# Patient Record
Sex: MALE | Race: WHITE | HISPANIC OR LATINO | URBAN - METROPOLITAN AREA
[De-identification: names, ages, dates, MRNs, and addresses within clinical notes are randomized per-mention and may not be internally consistent; named-entity substitution may affect disease eponyms.]

---

## 2022-04-08 ENCOUNTER — TELEPHONE (OUTPATIENT)
Dept: GASTROENTEROLOGY | Facility: CLINIC | Age: 42
End: 2022-04-08

## 2022-04-08 ENCOUNTER — HOSPITAL ENCOUNTER (OUTPATIENT)
Dept: RADIOLOGY | Facility: HOSPITAL | Age: 42
Discharge: HOME | End: 2022-04-08
Attending: INTERNAL MEDICINE

## 2022-04-08 ENCOUNTER — OFFICE VISIT (OUTPATIENT)
Dept: GASTROENTEROLOGY | Facility: CLINIC | Age: 42
End: 2022-04-08

## 2022-04-08 VITALS
WEIGHT: 182.75 LBS | HEART RATE: 70 BPM | DIASTOLIC BLOOD PRESSURE: 70 MMHG | HEIGHT: 68 IN | BODY MASS INDEX: 27.7 KG/M2 | SYSTOLIC BLOOD PRESSURE: 110 MMHG

## 2022-04-08 DIAGNOSIS — R10.13 EPIGASTRIC ABDOMINAL PAIN: ICD-10-CM

## 2022-04-08 DIAGNOSIS — K59.04 CHRONIC IDIOPATHIC CONSTIPATION: ICD-10-CM

## 2022-04-08 DIAGNOSIS — R10.13 EPIGASTRIC PAIN: Primary | ICD-10-CM

## 2022-04-08 PROCEDURE — 74018 RADEX ABDOMEN 1 VIEW: CPT | Mod: 26,,, | Performed by: RADIOLOGY

## 2022-04-08 PROCEDURE — 99204 OFFICE O/P NEW MOD 45 MIN: CPT | Mod: S$PBB,,, | Performed by: INTERNAL MEDICINE

## 2022-04-08 PROCEDURE — 74018 XR ABDOMEN AP 1 VIEW: ICD-10-PCS | Mod: 26,,, | Performed by: RADIOLOGY

## 2022-04-08 PROCEDURE — 74018 RADEX ABDOMEN 1 VIEW: CPT | Mod: TC

## 2022-04-08 PROCEDURE — 99204 PR OFFICE/OUTPT VISIT, NEW, LEVL IV, 45-59 MIN: ICD-10-PCS | Mod: S$PBB,,, | Performed by: INTERNAL MEDICINE

## 2022-04-08 PROCEDURE — 99999 PR PBB SHADOW E&M-NEW PATIENT-LVL IV: CPT | Mod: PBBFAC,,, | Performed by: INTERNAL MEDICINE

## 2022-04-08 PROCEDURE — 99999 PR PBB SHADOW E&M-NEW PATIENT-LVL IV: ICD-10-PCS | Mod: PBBFAC,,, | Performed by: INTERNAL MEDICINE

## 2022-04-08 PROCEDURE — 99204 OFFICE O/P NEW MOD 45 MIN: CPT | Mod: PBBFAC | Performed by: INTERNAL MEDICINE

## 2022-04-08 RX ORDER — OMEPRAZOLE 40 MG/1
40 CAPSULE, DELAYED RELEASE ORAL DAILY
Qty: 90 CAPSULE | Refills: 0 | Status: SHIPPED | OUTPATIENT
Start: 2022-04-08 | End: 2022-07-07

## 2022-04-08 NOTE — ASSESSMENT & PLAN NOTE
-Tried Miralax in the past without improvement   -Linzess 145mcg   -Continue high water intake  -High fiber diet  -Abdominal xray ordered

## 2022-04-08 NOTE — PROGRESS NOTES
"Clinic Consult:  [facility] Consultation Note    Reason for Consult:  The primary encounter diagnosis was Epigastric pain. Diagnoses of Chronic idiopathic constipation and Epigastric abdominal pain were also pertinent to this visit.    PCP: Primary Doctor No   No address on file    HPI:  This is a 41 y.o. male here for evaluation of abdominal pain.   He has been experiencing abdominal pain and burning in epigastric region.   This has been occurring for about 7 months.   Denies nausea, vomiting, hematemesis, diarrhea, melena or hematochezia.   He is having bowel movements daily but its small volume and sometimes he feels like he is unable to go.   Can occasionally have lower abdominal pain.   He had a colonoscopy in 2015.   Never had an upper endoscopy.   Patient is not on any medications.         ROS:  Review of Systems   Constitutional: Negative for activity change and unexpected weight change.   HENT: Negative for trouble swallowing.    Gastrointestinal: Positive for abdominal distention, abdominal pain and constipation. Negative for anal bleeding, blood in stool, diarrhea, nausea, rectal pain and vomiting.           Medical History:   History reviewed. No pertinent past medical history.    Surgical History:  History reviewed. No pertinent surgical history.    Family History:   History reviewed. No pertinent family history.    Social History:   Social History     Tobacco Use    Smoking status: Former Smoker     Types: Cigarettes    Smokeless tobacco: Never Used   Substance Use Topics    Alcohol use: Never    Drug use: Never       Allergies: Reviewed    Home Medications:         Physical Exam:  Vital Signs:  /70   Pulse 70   Ht 5' 8" (1.727 m)   Wt 82.9 kg (182 lb 12.2 oz)   BMI 27.79 kg/m²   Body mass index is 27.79 kg/m².    Physical Exam  Constitutional:       Appearance: Normal appearance.   Eyes:      Conjunctiva/sclera: Conjunctivae normal.   Abdominal:      General: Abdomen is " flat. There is no distension.      Palpations: Abdomen is soft.      Tenderness: There is no abdominal tenderness. There is no guarding.   Neurological:      Mental Status: He is alert.          Labs: Pertinent labs reviewed.        Assessment:  Problem List Items Addressed This Visit        GI    Epigastric abdominal pain    Current Assessment & Plan     -US abdomen   -PPI PO daily            Chronic idiopathic constipation    Current Assessment & Plan     -Tried Miralax in the past without improvement   -Linzess 145mcg   -Continue high water intake  -High fiber diet  -Abdominal xray ordered              Other Visit Diagnoses     Epigastric pain    -  Primary        Epigastric pain  -     US Abdomen Complete; Future; Expected date: 04/08/2022    Chronic idiopathic constipation  -     X-Ray Abdomen AP 1 View; Future; Expected date: 04/08/2022    Epigastric abdominal pain    Other orders  -     omeprazole (PRILOSEC) 40 MG capsule; Take 1 capsule (40 mg total) by mouth once daily.  Dispense: 90 capsule; Refill: 0  -     linaCLOtide (LINZESS) 145 mcg Cap capsule; Take 1 capsule (145 mcg total) by mouth before breakfast.  Dispense: 90 capsule; Refill: 0         Follow-up after diagnostic evaluation.       Order summary:  Orders Placed This Encounter   Procedures    US Abdomen Complete    X-Ray Abdomen AP 1 View       Thank you so much for allowing me to participate in the care of Luis Enamorado MD  Gastroenterology and Hepatology  [facility]

## 2022-04-12 ENCOUNTER — HOSPITAL ENCOUNTER (OUTPATIENT)
Dept: RADIOLOGY | Facility: HOSPITAL | Age: 42
Discharge: HOME | End: 2022-04-12
Attending: INTERNAL MEDICINE

## 2022-04-12 DIAGNOSIS — R10.13 EPIGASTRIC PAIN: ICD-10-CM

## 2022-04-12 PROCEDURE — 76700 US ABDOMEN COMPLETE: ICD-10-PCS | Mod: 26,,, | Performed by: RADIOLOGY

## 2022-04-12 PROCEDURE — 76700 US EXAM ABDOM COMPLETE: CPT | Mod: TC,PO

## 2022-04-12 PROCEDURE — 76700 US EXAM ABDOM COMPLETE: CPT | Mod: 26,,, | Performed by: RADIOLOGY

## 2022-07-19 ENCOUNTER — TELEPHONE (OUTPATIENT)
Dept: GASTROENTEROLOGY | Facility: CLINIC | Age: 42
End: 2022-07-19

## 2022-07-19 DIAGNOSIS — R10.9 ABDOMINAL PAIN, UNSPECIFIED ABDOMINAL LOCATION: Primary | ICD-10-CM

## 2022-07-19 NOTE — TELEPHONE ENCOUNTER
----- Message from Rhoda Love sent at 7/19/2022  4:33 PM CDT -----  Contact: [phone]  Patient wife  would like to consult with a nurse in regards to her  current pains. Please call back at [phone]. Thanks r/s

## 2022-07-19 NOTE — TELEPHONE ENCOUNTER
Returned call to pt's wife who stated pt is complaining of pressure and sharp pain in the lower abdomen. Pt states he is not constipated. Offered appt but he does not want to pay for a visit in case he has to have a scope. Pt's wife stated she thinks it was an EGD and not a Colonoscopy that he had in 2015. She will call and have the records sent to us. Pt's wife would like a call back in the mean time for advice on what he can do about the pain.

## 2022-07-25 ENCOUNTER — TELEPHONE (OUTPATIENT)
Dept: GASTROENTEROLOGY | Facility: CLINIC | Age: 42
End: 2022-07-25

## 2022-07-25 NOTE — TELEPHONE ENCOUNTER
----- Message from Roselyn Blair sent at 7/25/2022  8:11 AM CDT -----  Contact: Shana/ Wife  Shana is needing a call back regarding the patient's procedure he is set to have and she is stating no one has contacted her regarding a date or anything. Please call her back at [phone] after 1pm today.

## 2022-08-15 ENCOUNTER — TELEPHONE (OUTPATIENT)
Dept: GASTROENTEROLOGY | Facility: CLINIC | Age: 42
End: 2022-08-15

## 2022-08-15 NOTE — TELEPHONE ENCOUNTER
----- Message from Rhoda Love sent at 8/15/2022  9:56 AM CDT -----  Contact: [phone]  Patient wife  would like to consult with a nurse in regards to a referral for a urologist concerning his bladder. Please call back at [phone]. Thanks r/s

## 2022-08-15 NOTE — TELEPHONE ENCOUNTER
Returned call to pt's wife who stated pt believes his abdominal pain is coming from his bladder and would like to see a Urologist right away instead of doing the Endo procedure. Please advise if a referral can be placed for pt to see Urology.

## 2022-08-15 NOTE — TELEPHONE ENCOUNTER
Spoke with pt's wife who stated pt does not have a PCP who can refer him to Urology. Appt scheduled for 8/17/22. Pt does not have insurance and will pay cash.

## 2022-08-17 ENCOUNTER — TELEPHONE (OUTPATIENT)
Dept: UROLOGY | Facility: CLINIC | Age: 42
End: 2022-08-17

## 2022-08-17 ENCOUNTER — OFFICE VISIT (OUTPATIENT)
Dept: UROLOGY | Facility: CLINIC | Age: 42
End: 2022-08-17

## 2022-08-17 VITALS
SYSTOLIC BLOOD PRESSURE: 124 MMHG | HEART RATE: 54 BPM | DIASTOLIC BLOOD PRESSURE: 80 MMHG | BODY MASS INDEX: 27.7 KG/M2 | HEIGHT: 68 IN | WEIGHT: 182.75 LBS

## 2022-08-17 DIAGNOSIS — N50.82 SCROTAL PAIN: ICD-10-CM

## 2022-08-17 DIAGNOSIS — R10.32 LEFT LOWER QUADRANT ABDOMINAL PAIN: Primary | ICD-10-CM

## 2022-08-17 LAB
BILIRUB SERPL-MCNC: NORMAL MG/DL
BLOOD URINE, POC: NORMAL
CLARITY, POC UA: CLEAR
COLOR, POC UA: NORMAL
GLUCOSE UR QL STRIP: NORMAL
KETONES UR QL STRIP: NORMAL
LEUKOCYTE ESTERASE URINE, POC: NORMAL
NITRITE, POC UA: NORMAL
PH, POC UA: 5
POC RESIDUAL URINE VOLUME: 7 ML (ref 0–100)
PROTEIN, POC: NORMAL
SPECIFIC GRAVITY, POC UA: 1.02
UROBILINOGEN, POC UA: NORMAL

## 2022-08-17 PROCEDURE — 99204 PR OFFICE/OUTPT VISIT, NEW, LEVL IV, 45-59 MIN: ICD-10-PCS | Mod: S$PBB,,, | Performed by: NURSE PRACTITIONER

## 2022-08-17 PROCEDURE — 51798 US URINE CAPACITY MEASURE: CPT | Mod: PBBFAC | Performed by: NURSE PRACTITIONER

## 2022-08-17 PROCEDURE — 99213 OFFICE O/P EST LOW 20 MIN: CPT | Mod: PBBFAC | Performed by: NURSE PRACTITIONER

## 2022-08-17 PROCEDURE — 99204 OFFICE O/P NEW MOD 45 MIN: CPT | Mod: S$PBB,,, | Performed by: NURSE PRACTITIONER

## 2022-08-17 PROCEDURE — 99999 PR PBB SHADOW E&M-EST. PATIENT-LVL III: CPT | Mod: PBBFAC,,, | Performed by: NURSE PRACTITIONER

## 2022-08-17 PROCEDURE — 99999 PR PBB SHADOW E&M-EST. PATIENT-LVL III: ICD-10-PCS | Mod: PBBFAC,,, | Performed by: NURSE PRACTITIONER

## 2022-08-17 PROCEDURE — 81002 URINALYSIS NONAUTO W/O SCOPE: CPT | Mod: PBBFAC | Performed by: NURSE PRACTITIONER

## 2022-08-17 NOTE — TELEPHONE ENCOUNTER
Faxed signed JACOB form with confirmation received to BRALIA at Medical Records Fax# [phone] to obtain CT/any imaging related to patient's condition. Scanned signed form in chart, AL, LPN.

## 2022-08-17 NOTE — PROGRESS NOTES
Chief Complaint:   Lower abdominal pain  Chronic scrotal pain  Chronic pain with voiding    HPI:   Patient is a 42-year-old male that is presenting with chronic lower abdominal pain.  Patient states that he has lower abdominal pain, daily, when he voids.  Reports that abdominal pain increases with voiding.  Denies flank pain, gross hematuria, straining to void or history of renal stones.  Patient states that he has chronic bilateral scrotal pain, on and off, for years.  Reports that he has had multiple scrotal ultrasounds, all normal.  Urine in clinic is negative.  PVR is low, 7 mL.  Patient was recently seen by GI and had a reassuring abdominal ultrasound and x-ray.      Allergies:  Patient has no known allergies.    Medications:  has a current medication list which includes the following prescription(s): linaclotide and omeprazole.    Review of Systems:  General: No fever, chills, fatigability, or weight loss.  Skin: No rashes, itching, or changes in color or texture of skin.  Chest: Denies CIFUENTES, cyanosis, wheezing, cough, and sputum production.  Abdomen: See HPI  Musculoskeletal: No joint stiffness or swelling. Denies back pain.  : As above.  All other review of systems negative.    PMH:  none    PSH:  none    FamHx: none    SocHx:  reports that he has quit smoking. His smoking use included cigarettes. He has never used smokeless tobacco. He reports that he does not drink alcohol and does not use drugs.      Physical Exam:  Vitals:    08/17/22 0943   BP: 124/80   Pulse: (!) 54     General: A&Ox3, no apparent distress, no deformities  Neck: No masses, normal thyroid  Lungs: normal inspiration, no use of accessory muscles  Heart: normal pulse, no arrhythmias  Abdomen: Soft, NT, ND, no masses, no hernias, no hepatosplenomegaly  Lymphatic: Neck and groin nodes negative  Skin: The skin is warm and dry. No jaundice.  Ext: No c/c/e.  :  Test desc sun, no abnormalities of epididymus. Penis  with normal penile and  scrotal skin. Meatus normal.  Labs/Studies:   See HPI    Impression/Plan:   Will proceed with CT urogram and patient to return to clinic for cystoscopy.  Physical exam was unremarkable.

## 2022-09-14 ENCOUNTER — PROCEDURE VISIT (OUTPATIENT)
Dept: UROLOGY | Facility: CLINIC | Age: 42
End: 2022-09-14

## 2022-09-14 VITALS
HEART RATE: 56 BPM | BODY MASS INDEX: 27.58 KG/M2 | DIASTOLIC BLOOD PRESSURE: 86 MMHG | SYSTOLIC BLOOD PRESSURE: 134 MMHG | WEIGHT: 182 LBS | RESPIRATION RATE: 18 BRPM | HEIGHT: 68 IN

## 2022-09-14 DIAGNOSIS — R30.0 DYSURIA: Primary | ICD-10-CM

## 2022-09-14 DIAGNOSIS — N50.82 SCROTAL PAIN: ICD-10-CM

## 2022-09-14 DIAGNOSIS — R10.32 LEFT LOWER QUADRANT ABDOMINAL PAIN: ICD-10-CM

## 2022-09-14 PROCEDURE — 52000 PR CYSTOURETHROSCOPY: ICD-10-PCS | Mod: S$PBB,,, | Performed by: UROLOGY

## 2022-09-14 PROCEDURE — 52000 CYSTOURETHROSCOPY: CPT | Mod: S$PBB,,, | Performed by: UROLOGY

## 2022-09-14 PROCEDURE — 52000 CYSTOURETHROSCOPY: CPT | Mod: PBBFAC | Performed by: UROLOGY

## 2022-09-14 RX ORDER — CIPROFLOXACIN 500 MG/1
500 TABLET ORAL
Status: COMPLETED | OUTPATIENT
Start: 2022-09-14 | End: 2022-09-14

## 2022-09-14 RX ORDER — SOLIFENACIN SUCCINATE 10 MG/1
10 TABLET, FILM COATED ORAL DAILY
Qty: 30 TABLET | Refills: 11 | Status: SHIPPED | OUTPATIENT
Start: 2022-09-14

## 2022-09-14 RX ORDER — LIDOCAINE HYDROCHLORIDE 20 MG/ML
JELLY TOPICAL
Status: COMPLETED | OUTPATIENT
Start: 2022-09-14 | End: 2022-09-14

## 2022-09-14 RX ADMIN — CIPROFLOXACIN 500 MG: 500 TABLET ORAL at 11:09

## 2022-09-14 RX ADMIN — LIDOCAINE HYDROCHLORIDE: 20 JELLY TOPICAL at 11:09

## 2022-09-14 NOTE — PROGRESS NOTES
..Per Dr. March oral ciprofloxacin was given to pt. Pt instructed to remain in clinic for 15 minutes to monitor for signs & symptoms of adverse reaction. Pt verbalized understanding.        Loraine Milanes RN

## 2022-09-14 NOTE — PROCEDURES
Procedures  Chief Complaint:   Encounter Diagnoses   Name Primary?    Dysuria Yes    Left lower quadrant abdominal pain     Scrotal pain          HPI:   9/14/22- here today for cystoscopy.  Referred by Vaishali, due to persistent lower abdominal pain.  8/17/22- LR- Patient is a 42-year-old male that is presenting with chronic lower abdominal pain.  Patient states that he has lower abdominal pain, daily, when he voids.  Reports that abdominal pain increases with voiding.  Denies flank pain, gross hematuria, straining to void or history of renal stones.  Patient states that he has chronic bilateral scrotal pain, on and off, for years.  Reports that he has had multiple scrotal ultrasounds, all normal.  Urine in clinic is negative.  PVR is low, 7 mL.  Patient was recently seen by GI and had a reassuring abdominal ultrasound and x-ray.      Allergies:  Patient has no known allergies.    Medications:  has a current medication list which includes the following prescription(s): linaclotide and omeprazole.    Review of Systems:  General: No fever, chills, fatigability, or weight loss.  Skin: No rashes, itching, or changes in color or texture of skin.  Chest: Denies CIFUENTES, cyanosis, wheezing, cough, and sputum production.  Abdomen: See HPI  Musculoskeletal: No joint stiffness or swelling. Denies back pain.  : As above.  All other review of systems negative.    PMH:  none    PSH:  none    FamHx: none    SocHx:  reports that he has quit smoking. His smoking use included cigarettes. He has never used smokeless tobacco. He reports that he does not drink alcohol and does not use drugs.      Physical Exam:  There were no vitals filed for this visit.    General: A&Ox3, no apparent distress, no deformities  Neck: No masses, normal thyroid  Lungs: normal inspiration, no use of accessory muscles  Heart: normal pulse, no arrhythmias  Abdomen: Soft, NT, ND  Skin: The skin is warm and dry. No jaundice.  Ext: No c/c/e.  : 9/22- Test desc  sun, no abnormalities of epididymus. Penis  with normal penile and scrotal skin. Meatus normal.    Labs/Studies:   UA trace protein 8/22  Pvr 7 ml 8/22  Cystoscopy normal 9/22  AUS/KUB normal kidneys 4/22  CT without contrast normal 4/21  LUO normal 4/21    CT urogram pending    Procedure: Diagnostic Cystoscopy    Procedure in Detail: After proper consents were obtained, the patient was prepped and draped in normal sterile fashion for diagnostic cystoscopy. 5 ml of lidocaine jelly was instilled in the urethra. The flexible cystoscope was then introduced into the urethra, and advanced into the bladder under direct vision. The urethral mucosa appeared normal, and no strictures were noted. The sphincter appeared to be normal, and the veru montanum was unremarkable. The prostatic mucosa unremarkable. The bladder neck was normal. Inspection of the interior of the bladder was then carried out. The trigone was unremarkable, with no mucosal lesions. The ureteral orifices were normal in position and configuration. Systematic inspection of the mucosa of the bladder it was then carried out, rotating the cystoscope so that all areas of the left and right lateral walls, the dome of the bladder, and the posterior wall were all visualized. The cystoscope was then advanced further into the bladder, and maximum deflection of the scope was performed so that the bladder neck could be inspected. No mucosal lesions were noted there. The cystoscope was then removed, and the procedure terminated.     Findings:  Normal cystoscopy      Impression/Plan:     1. Lower abdominal pain- previous usage of antibiotics, further diagnostic studies and cystoscopy all appear to be relatively normal.  Given the fact that this pain happens prior to voiding and right during voiding but then resolves with increased urgency, I believe this could be bladder spasms.  Therefore will attempt a VESIcare 10 mg trial.  I have informed him that this may cause dry  mouth, dry eyes, urinary retention and constipation.  If he has any issues he will contact our office and stop the medication, re-evaluate in 2 months.    2. Testicular pain- reassess after VESIcare trial, antibiotics previously did not work and previous ultrasounds were normal.    3. Dysuria- primarily described as lower abdominal pain, not consistent with cystitis.  Nothing further at this point.

## 2022-09-20 DIAGNOSIS — R10.9 ABDOMINAL PAIN, UNSPECIFIED ABDOMINAL LOCATION: Primary | ICD-10-CM

## 2022-09-28 ENCOUNTER — TELEPHONE (OUTPATIENT)
Dept: UROLOGY | Facility: CLINIC | Age: 42
End: 2022-09-28

## 2022-09-28 NOTE — TELEPHONE ENCOUNTER
----- Message from Autumn Briggs sent at 9/28/2022 10:30 AM CDT -----  Contact: Shana @ [phone]  Good Afternoon  Patient is calling returning call.    Patient is returning a phone call.  Who left a message for the patient: Dr silverio   Does patient know what this is regarding:    Would you like a call back, or a response through your [facility] portal?:   call   Comments:       Informed patient's caregiver that patient doesn't have an appointment until November.

## 2022-11-30 ENCOUNTER — OFFICE VISIT (OUTPATIENT)
Dept: UROLOGY | Facility: CLINIC | Age: 42
End: 2022-11-30

## 2022-11-30 VITALS — WEIGHT: 182 LBS | BODY MASS INDEX: 27.58 KG/M2 | HEIGHT: 68 IN

## 2022-11-30 DIAGNOSIS — R10.32 LEFT LOWER QUADRANT ABDOMINAL PAIN: ICD-10-CM

## 2022-11-30 DIAGNOSIS — R30.0 DYSURIA: Primary | ICD-10-CM

## 2022-11-30 DIAGNOSIS — N50.82 SCROTAL PAIN: ICD-10-CM

## 2022-11-30 PROCEDURE — 99213 OFFICE O/P EST LOW 20 MIN: CPT | Mod: PBBFAC | Performed by: UROLOGY

## 2022-11-30 PROCEDURE — 99214 PR OFFICE/OUTPT VISIT, EST, LEVL IV, 30-39 MIN: ICD-10-PCS | Mod: S$PBB,,, | Performed by: UROLOGY

## 2022-11-30 PROCEDURE — 99999 PR PBB SHADOW E&M-EST. PATIENT-LVL III: CPT | Mod: PBBFAC,,, | Performed by: UROLOGY

## 2022-11-30 PROCEDURE — 99999 PR PBB SHADOW E&M-EST. PATIENT-LVL III: ICD-10-PCS | Mod: PBBFAC,,, | Performed by: UROLOGY

## 2022-11-30 PROCEDURE — 99214 OFFICE O/P EST MOD 30 MIN: CPT | Mod: S$PBB,,, | Performed by: UROLOGY

## 2022-11-30 RX ORDER — MIRABEGRON 50 MG/1
50 TABLET, FILM COATED, EXTENDED RELEASE ORAL DAILY
Qty: 30 TABLET | Refills: 11 | Status: SHIPPED | OUTPATIENT
Start: 2022-11-30 | End: 2023-11-30

## 2022-11-30 NOTE — PROGRESS NOTES
Chief Complaint:   Encounter Diagnoses   Name Primary?    Dysuria Yes    Left lower quadrant abdominal pain     Scrotal pain        HPI:   11/30/22- patient still having some lower abdominal pain this does appear to be better with VESIcare though.  It appears to come in bout when he needs to void with some relief, he also suffers from severe constipation.  No evidence of testicular pain.  8/17/22- LR- Patient is a 42-year-old male that is presenting with chronic lower abdominal pain.  Patient states that he has lower abdominal pain, daily, when he voids.  Reports that abdominal pain increases with voiding.  Denies flank pain, gross hematuria, straining to void or history of renal stones.  Patient states that he has chronic bilateral scrotal pain, on and off, for years.  Reports that he has had multiple scrotal ultrasounds, all normal.  Urine in clinic is negative.  PVR is low, 7 mL.  Patient was recently seen by GI and had a reassuring abdominal ultrasound and x-ray.      Allergies:  Patient has no known allergies.    Medications:  has a current medication list which includes the following prescription(s): linaclotide and omeprazole.    Review of Systems:  General: No fever, chills, fatigability, or weight loss.  Skin: No rashes, itching, or changes in color or texture of skin.  Chest: Denies CIFUENTES, cyanosis, wheezing, cough, and sputum production.  Abdomen: See HPI  Musculoskeletal: No joint stiffness or swelling. Denies back pain.  : As above.  All other review of systems negative.    PMH:  none    PSH:  none    FamHx: none    SocHx:  reports that he has quit smoking. His smoking use included cigarettes. He has never used smokeless tobacco. He reports that he does not drink alcohol and does not use drugs.      Physical Exam:  There were no vitals filed for this visit.    General: A&Ox3, no apparent distress, no deformities  Neck: No masses, normal thyroid  Lungs: normal inspiration, no use of accessory  muscles  Heart: normal pulse, no arrhythmias  Abdomen: Soft, NT, ND  Skin: The skin is warm and dry. No jaundice.  Ext: No c/c/e.  : 9/22- Test desc sun, no abnormalities of epididymus. Penis  with normal penile and scrotal skin. Meatus normal.    Labs/Studies:   UA trace protein 8/22  Pvr 7 ml 8/22  Cystoscopy normal 9/22  AUS/KUB normal kidneys 4/22  CT without contrast normal 4/21  LOU normal 4/21  CT urogram pending    Impression/Plan:     1. Lower abdominal pain- VESIcare has assisted to some extent, will try Myrbetriq 50 mg instead due to his severe constipation.  At this point it appears that his pain comes along when he really has to void, but due to the severe constipation this may be multifocal.  I believe with appropriate control of his constipation and possible monitoring of his diet, in case there is a form of IC involved, this should control his symptoms.  Otherwise cystoscopy was unremarkable, we are obtaining the old CT results as they still have not sent them.  Of note previous antibiotics did not assist, relatively ruling out infectious etiologies.  I will see him back in 2 months.  Call with any complaints in the meantime.    2. Testicular pain- no evidence of recurrence.    3. Dysuria- no evidence of recurrence.

## 2023-01-13 ENCOUNTER — TELEPHONE (OUTPATIENT)
Dept: UROLOGY | Facility: CLINIC | Age: 43
End: 2023-01-13

## 2023-01-31 ENCOUNTER — TELEPHONE (OUTPATIENT)
Dept: GASTROENTEROLOGY | Facility: CLINIC | Age: 43
End: 2023-01-31

## 2023-01-31 NOTE — TELEPHONE ENCOUNTER
Returned call to pt's wife who stated pt needs to Dr Enamorado for pain in his lower abdomen. States his reflux has improved but he has been seeing a Urologist for the lower abdominal pain and they are unable to help him. Pt only wants to see Dr Enamorado. Appt scheduled for first available 3/1/23. Pt also placed on cancellation list.

## 2023-01-31 NOTE — TELEPHONE ENCOUNTER
----- Message from Rhoda Love sent at 1/31/2023 12:48 PM CST -----  Contact: [phone]  Type:  Sooner Apoointment Request    Caller is requesting a sooner appointment.  Caller declined first available appointment listed below.  Caller will not accept being placed on the waitlist and is requesting a message be sent to doctor.  Name of Caller Wife    When is the first available appointment?  4/2023   Symptoms:f/u   Would the patient rather a call back or a response via [facility]? Call back   Best Call Back Number:[phone]  Additional Information:          complains of pain/discomfort

## 2023-03-01 ENCOUNTER — OFFICE VISIT (OUTPATIENT)
Dept: GASTROENTEROLOGY | Facility: CLINIC | Age: 43
End: 2023-03-01

## 2023-03-01 VITALS
BODY MASS INDEX: 29.27 KG/M2 | WEIGHT: 193.13 LBS | HEART RATE: 76 BPM | SYSTOLIC BLOOD PRESSURE: 110 MMHG | DIASTOLIC BLOOD PRESSURE: 72 MMHG | HEIGHT: 68 IN

## 2023-03-01 DIAGNOSIS — R10.30 LOWER ABDOMINAL PAIN: ICD-10-CM

## 2023-03-01 DIAGNOSIS — R10.11 RUQ ABDOMINAL PAIN: ICD-10-CM

## 2023-03-01 PROCEDURE — 99213 OFFICE O/P EST LOW 20 MIN: CPT | Mod: S$PBB,,, | Performed by: INTERNAL MEDICINE

## 2023-03-01 PROCEDURE — 99213 OFFICE O/P EST LOW 20 MIN: CPT | Mod: PBBFAC | Performed by: INTERNAL MEDICINE

## 2023-03-01 PROCEDURE — 99213 PR OFFICE/OUTPT VISIT, EST, LEVL III, 20-29 MIN: ICD-10-PCS | Mod: S$PBB,,, | Performed by: INTERNAL MEDICINE

## 2023-03-01 PROCEDURE — 99999 PR PBB SHADOW E&M-EST. PATIENT-LVL III: CPT | Mod: PBBFAC,,, | Performed by: INTERNAL MEDICINE

## 2023-03-01 PROCEDURE — 99999 PR PBB SHADOW E&M-EST. PATIENT-LVL III: ICD-10-PCS | Mod: PBBFAC,,, | Performed by: INTERNAL MEDICINE

## 2023-03-01 NOTE — PROGRESS NOTES
"Clinic Progress Note:  [facility] Progress Note    Reason for Visit:  Diagnoses of RUQ abdominal pain and Lower abdominal pain were pertinent to this visit.    PCP: Primary Doctor No       HPI:  This is a 42 y.o. male here for evaluation of the following:     //RUQ abdominal pain   This radiates to his back.   He can feel it over his rib   It hurts with movement     //Lower abdominal pain.   This has been occurring for years  H/o constipation. Was prescribed Linzess. Did not take it  Increased water intake and constipation has resolved   Seen in urology for pain and started on medications   US abdomen and abdominal xray that has been negative       ROS:  As above HPI       Allergies: Reviewed    Home Medications:   Medication List with Changes/Refills   Current Medications    LINACLOTIDE (LINZESS) 145 MCG CAP CAPSULE    Take 1 capsule (145 mcg total) by mouth before breakfast.    MIRABEGRON (MYRBETRIQ) 50 MG TB24    Take 1 tablet (50 mg total) by mouth once daily.    OMEPRAZOLE (PRILOSEC) 40 MG CAPSULE    Take 1 capsule (40 mg total) by mouth once daily.    SOLIFENACIN (VESICARE) 10 MG TABLET    Take 1 tablet (10 mg total) by mouth once daily.         Physical Exam:  Vital Signs:  /72   Pulse 76   Ht 5' 8" (1.727 m)   Wt 87.6 kg (193 lb 2 oz)   BMI 29.36 kg/m²   Body mass index is 29.36 kg/m².    Physical Exam  Constitutional:       Appearance: Normal appearance.   HENT:      Head: Normocephalic.   Eyes:      Conjunctiva/sclera: Conjunctivae normal.   Abdominal:      General: There is no distension.      Palpations: Abdomen is soft.      Tenderness: There is no abdominal tenderness. There is no guarding.   Neurological:      Mental Status: He is alert.        Labs: Pertinent labs reviewed.          Assessment:  Problem List Items Addressed This Visit          GI    RUQ abdominal pain    Current Assessment & Plan     Plan:   -Suspect pain is secondary to musculoskeletal pain  -Offered an US " abdomen   -Patient needs to follow with PCP for pain          Lower abdominal pain    Current Assessment & Plan     Plan:   -Offered a colonoscopy. Patient is self pay and said it would be too expensive.   Will give patient and wife a number with resources to help them future diagnostic test and procedures.           RUQ abdominal pain    Lower abdominal pain                No follow-ups on file.    Order summary:  No orders of the defined types were placed in this encounter.      Thank you so much for allowing me to participate in the care of Luis Enamorado MD  Gastroenterology and Hepatology  [facility]

## 2023-03-01 NOTE — ASSESSMENT & PLAN NOTE
Plan:   -Offered a colonoscopy. Patient is self pay and said it would be too expensive.   Will give patient and wife a number with resources to help them future diagnostic test and procedures.

## 2023-03-01 NOTE — ASSESSMENT & PLAN NOTE
Plan:   -Suspect pain is secondary to musculoskeletal pain  -Offered an US abdomen   -Patient needs to follow with PCP for pain

## 2023-03-06 ENCOUNTER — TELEPHONE (OUTPATIENT)
Dept: GASTROENTEROLOGY | Facility: CLINIC | Age: 43
End: 2023-03-06

## 2023-03-06 NOTE — TELEPHONE ENCOUNTER
Returned call to pt's wife, Shana who is requesting a referral to [facility] for the Colonoscopy recommended by Dr Enamorado. Will send message.

## 2023-03-06 NOTE — TELEPHONE ENCOUNTER
----- Message from Yoan Coats LPN sent at 3/3/2023  4:24 PM CST -----  Contact: Shana / Wife    ----- Message -----  From: Yoan Coats LPN  Sent: 3/3/2023  10:00 AM CST  To: Yoan Coats LPN      ----- Message -----  From: Angella Velasco  Sent: 3/2/2023   3:09 PM CST  To: Fei HACKETT Staff    Patients wife Shana is calling to speak with the nurse in regards to referral. Reports needing a referral sent for patient to have a colonoscopy. Please give Shana a callback at [phone].

## 2023-03-07 NOTE — TELEPHONE ENCOUNTER
Advised pt's wife, Shana, per Dr Enamorado, that pt needs to get established with a GI Provider at [facility] and they will determine what he needs. Pt's wife already has phone number and address